# Patient Record
Sex: FEMALE | Race: OTHER | NOT HISPANIC OR LATINO | URBAN - METROPOLITAN AREA
[De-identification: names, ages, dates, MRNs, and addresses within clinical notes are randomized per-mention and may not be internally consistent; named-entity substitution may affect disease eponyms.]

---

## 2017-06-19 ENCOUNTER — INPATIENT (INPATIENT)
Facility: HOSPITAL | Age: 72
LOS: 2 days | Discharge: HOME | DRG: 641 | End: 2017-06-22
Attending: HOSPITALIST | Admitting: HOSPITALIST
Payer: MEDICAID

## 2017-06-19 VITALS
TEMPERATURE: 100 F | OXYGEN SATURATION: 99 % | RESPIRATION RATE: 16 BRPM | DIASTOLIC BLOOD PRESSURE: 74 MMHG | HEART RATE: 64 BPM | SYSTOLIC BLOOD PRESSURE: 152 MMHG

## 2017-06-19 LAB
ALBUMIN SERPL ELPH-MCNC: 3.7 G/DL — SIGNIFICANT CHANGE UP (ref 3.3–5)
ALP SERPL-CCNC: 37 U/L — LOW (ref 40–120)
ALT FLD-CCNC: 17 U/L RC — SIGNIFICANT CHANGE UP (ref 10–45)
ANION GAP SERPL CALC-SCNC: 13 MMOL/L — SIGNIFICANT CHANGE UP (ref 5–17)
APTT BLD: 23.1 SEC — LOW (ref 27.5–37.4)
AST SERPL-CCNC: 21 U/L — SIGNIFICANT CHANGE UP (ref 10–40)
BASOPHILS # BLD AUTO: 0.1 K/UL — SIGNIFICANT CHANGE UP (ref 0–0.2)
BASOPHILS NFR BLD AUTO: 1 % — SIGNIFICANT CHANGE UP (ref 0–2)
BILIRUB SERPL-MCNC: 0.3 MG/DL — SIGNIFICANT CHANGE UP (ref 0.2–1.2)
BUN SERPL-MCNC: 21 MG/DL — SIGNIFICANT CHANGE UP (ref 7–23)
CALCIUM SERPL-MCNC: 9.2 MG/DL — SIGNIFICANT CHANGE UP (ref 8.4–10.5)
CHLORIDE SERPL-SCNC: 108 MMOL/L — SIGNIFICANT CHANGE UP (ref 96–108)
CK MB BLD-MCNC: 2.3 % — SIGNIFICANT CHANGE UP (ref 0–3.5)
CK MB CFR SERPL CALC: 2.9 NG/ML — SIGNIFICANT CHANGE UP (ref 0–3.8)
CK SERPL-CCNC: 125 U/L — SIGNIFICANT CHANGE UP (ref 25–170)
CO2 SERPL-SCNC: 22 MMOL/L — SIGNIFICANT CHANGE UP (ref 22–31)
CREAT SERPL-MCNC: 0.83 MG/DL — SIGNIFICANT CHANGE UP (ref 0.5–1.3)
EOSINOPHIL # BLD AUTO: 0.3 K/UL — SIGNIFICANT CHANGE UP (ref 0–0.5)
EOSINOPHIL NFR BLD AUTO: 5 % — SIGNIFICANT CHANGE UP (ref 0–6)
GAS PNL BLDV: SIGNIFICANT CHANGE UP
GLUCOSE SERPL-MCNC: 79 MG/DL — SIGNIFICANT CHANGE UP (ref 70–99)
HCT VFR BLD CALC: 34.7 % — SIGNIFICANT CHANGE UP (ref 34.5–45)
HGB BLD-MCNC: 11.3 G/DL — LOW (ref 11.5–15.5)
INR BLD: 1.01 RATIO — SIGNIFICANT CHANGE UP (ref 0.88–1.16)
LYMPHOCYTES # BLD AUTO: 1.7 K/UL — SIGNIFICANT CHANGE UP (ref 1–3.3)
LYMPHOCYTES # BLD AUTO: 33.6 % — SIGNIFICANT CHANGE UP (ref 13–44)
MCHC RBC-ENTMCNC: 31.9 PG — SIGNIFICANT CHANGE UP (ref 27–34)
MCHC RBC-ENTMCNC: 32.7 GM/DL — SIGNIFICANT CHANGE UP (ref 32–36)
MCV RBC AUTO: 97.7 FL — SIGNIFICANT CHANGE UP (ref 80–100)
MONOCYTES # BLD AUTO: 0.4 K/UL — SIGNIFICANT CHANGE UP (ref 0–0.9)
MONOCYTES NFR BLD AUTO: 8.4 % — SIGNIFICANT CHANGE UP (ref 2–14)
NEUTROPHILS # BLD AUTO: 2.7 K/UL — SIGNIFICANT CHANGE UP (ref 1.8–7.4)
NEUTROPHILS NFR BLD AUTO: 52 % — SIGNIFICANT CHANGE UP (ref 43–77)
PLATELET # BLD AUTO: 210 K/UL — SIGNIFICANT CHANGE UP (ref 150–400)
POTASSIUM SERPL-MCNC: 3.9 MMOL/L — SIGNIFICANT CHANGE UP (ref 3.5–5.3)
POTASSIUM SERPL-SCNC: 3.9 MMOL/L — SIGNIFICANT CHANGE UP (ref 3.5–5.3)
PROT SERPL-MCNC: 7.9 G/DL — SIGNIFICANT CHANGE UP (ref 6–8.3)
PROTHROM AB SERPL-ACNC: 10.9 SEC — SIGNIFICANT CHANGE UP (ref 9.8–12.7)
RBC # BLD: 3.55 M/UL — LOW (ref 3.8–5.2)
RBC # FLD: 11.6 % — SIGNIFICANT CHANGE UP (ref 10.3–14.5)
SODIUM SERPL-SCNC: 143 MMOL/L — SIGNIFICANT CHANGE UP (ref 135–145)
TROPONIN T SERPL-MCNC: <0.01 NG/ML — SIGNIFICANT CHANGE UP (ref 0–0.06)
TROPONIN T SERPL-MCNC: <0.01 NG/ML — SIGNIFICANT CHANGE UP (ref 0–0.06)
TSH SERPL-MCNC: 5.11 UIU/ML — HIGH (ref 0.27–4.2)
WBC # BLD: 5.2 K/UL — SIGNIFICANT CHANGE UP (ref 3.8–10.5)
WBC # FLD AUTO: 5.2 K/UL — SIGNIFICANT CHANGE UP (ref 3.8–10.5)

## 2017-06-19 PROCEDURE — 99220: CPT | Mod: 25

## 2017-06-19 PROCEDURE — 93010 ELECTROCARDIOGRAM REPORT: CPT

## 2017-06-19 RX ORDER — ASPIRIN/CALCIUM CARB/MAGNESIUM 324 MG
81 TABLET ORAL DAILY
Qty: 0 | Refills: 0 | Status: DISCONTINUED | OUTPATIENT
Start: 2017-06-19 | End: 2017-06-22

## 2017-06-19 RX ORDER — ACETAMINOPHEN 500 MG
650 TABLET ORAL EVERY 6 HOURS
Qty: 0 | Refills: 0 | Status: DISCONTINUED | OUTPATIENT
Start: 2017-06-19 | End: 2017-06-22

## 2017-06-19 RX ORDER — DIPHENHYDRAMINE HCL 50 MG
50 CAPSULE ORAL ONCE
Qty: 0 | Refills: 0 | Status: COMPLETED | OUTPATIENT
Start: 2017-06-19 | End: 2017-06-19

## 2017-06-19 RX ADMIN — Medication 40 MILLIGRAM(S): at 14:03

## 2017-06-19 RX ADMIN — Medication 50 MILLIGRAM(S): at 17:00

## 2017-06-19 NOTE — ED PROVIDER NOTE - OBJECTIVE STATEMENT
71 y/o female w/ uncertain medical history (sees docs in [place]) presents with months of palptiations, right hip pain, and intermittent headaches.  Came to ER today because last night she developed a nonproductive cough. PAtient has no doctors in the [place] and is visiting for 3 months.  Came from [place] 2 weeks prior. no sick contacts, no fevers, no nausea/vomiting, no shortness of breath.

## 2017-06-19 NOTE — ED PROVIDER NOTE - PROGRESS NOTE DETAILS
Pt with pvc's on monitor.  palpitations coincide with pvc length. When questioned if palpitations are present when pvcs resolved, patient has no palpitations.

## 2017-06-19 NOTE — ED CDU PROVIDER NOTE - PHYSICAL EXAMINATION
. Pelvis is stable. R hip pain elicited w ROM. CN2-12 intact 5/5 UE/LE nml sensation. Extremity with no swelling or calf tenderness.

## 2017-06-19 NOTE — ED PROVIDER NOTE - ATTENDING CONTRIBUTION TO CARE
****ATTENDING**** 73yo f hx HTN, CAD recently travelled from [place] (where she lives) pw HA, chest pain, palpitations and R hip pain. As per niece, pt has had symptoms chronically over time and co symptoms to her who brought her to the ED for evaluation. Pt co generalized HA, no change in vision, nausea vomiting, improves intermittently w meds. Also reports palpitations and chest pain, never had stress test before. No cough, uri, fever or chills. Also reports R hip pain no trauma. Pain is worse with activity.  EKG reviewed. Check Labs, CE, CT Head, Xray Hip. Patient with chronic symptoms, no prior cardiac work up. Admit to CDU for stress test.

## 2017-06-19 NOTE — ED CDU PROVIDER NOTE - ATTENDING CONTRIBUTION TO CARE
****ATTENDING**** 73yo f hx HTN, CAD recently travelled from [place] (where she lives) pw HA, chest pain, palpitations and R hip pain. As per niece, pt has had symptoms chronically over time and co symptoms to her who brought her to the ED for evaluation. Pt co generalized HA, no change in vision, nausea vomiting, improves intermittently w meds. Also reports palpitations and chest pain, never had stress test before. No cough, uri, fever or chills. Also reports R hip pain no trauma. Pain is worse with activity.  EKG reviewed. Labs and radiology reviewed. Patient does not have outpatient follow up and no prior cardiac work up. Admit patient to CDU for monitoring and stress test.

## 2017-06-19 NOTE — ED PROVIDER NOTE - PHYSICAL EXAMINATION
****ATTENDING**** Patient is awake,alert,oriented x 3. Patient is well appearing and in no acute distress. Patient's chest is clear to ausculation, +s1s2. Abdomen is soft nd/nt +BS. Pelvis is stable. R hip pain elicited w ROM. CN2-12 intact 5/5 UE/LE nml sensation. Extremity with no swelling or calf tenderness.

## 2017-06-19 NOTE — ED ADULT NURSE NOTE - OBJECTIVE STATEMENT
72 y f came to the ed with palpitations. states they started last night and continued through this morning so she came to the ed. states having a cardiac hx and is seen in her native country of [place] by a cardiologist. also c/o of a nonproductive cough. patient is a/ox3. denies any sob but c/o chest pain with the palpitations. denies n/v/d. abdomen is soft and nontender. skin is warm and dry.

## 2017-06-19 NOTE — ED PROVIDER NOTE - MEDICAL DECISION MAKING DETAILS
****ATTENDING**** 71yo f hx HTN, CAD recently travelled from [place] (where she lives) pw HA, chest pain, palpitations and R hip pain. As per niece, pt has had symptoms chronically over time and co symptoms to her who brought her to the ED for evaluation. Pt co generalized HA, no change in vision, nausea vomiting, improves intermittently w meds. Also reports palpitations and chest pain, never had stress test before. No cough, uri, fever or chills. Also reports R hip pain no trauma. Pain is worse with activity.  EKG reviewed. Check Labs, CE, CT Head, Xray Hip. Patient with chronic symptoms, no prior cardiac work up. Admit to CDU for stress test.

## 2017-06-19 NOTE — ED CDU PROVIDER NOTE - PLAN OF CARE
1. Follow up with your PMD and/or cardiologist within 48-72 hours.   2. Show copies of your reports given to you. Recommend Aspirin 81mg over the counter daily until further evaluation.  Take all of your other medications as previously prescribed.   3. Worsening or continued chest pain, shortness of breath, weakness, return to ED.

## 2017-06-19 NOTE — ED CDU PROVIDER NOTE - PROGRESS NOTE DETAILS
Patient resting in bed comfortably. No distress, no complaints. Vital Signs Stable. No events on telemetry monitor; pending cta results -JAN Rivera CDU PROGRESS NOTE JAN HERNANDEZ: Pt resting comfortably, feeling well without complaint. NAD, VSS. No events on telemetry. Patient resting comfortably. NAD. NSR on tele. VSS. CE #2 negative. EKG unchanged. Cont to monitor. Proceed with Stress test in am -PA Pao Rivera Pt resting comfortably. NAD. No complaints. VSS. pending stress test. waiting for daughter to arrive. -JAN Rivera at stress test. -JAN Rivera pt back from stress. no complaints. VSS. +stress test with signs of ischemia. Called Dr. Almanzar for further management. Case discussed with Dr. Maira Rivera Patient resting comfortably. NAD. NSR on tele. VSS. CE #2 negative. EKG unchanged. Cont to monitor. Proceed with Stress test in am. Daughter brought meds over and pt takes enalapril 5mg daily, Asa 300mg daily, Digoxin 0.25mg daily (?Afib).-JAN Rivera Dr. Rao Note: I have personally performed a face to face diagnostic evaluation on this patient.  I have reviewed the ACP note and agree with the history, exam, and plan of care, except as noted.  History and Exam by me shows well appearing, NAD, no chest pain currently but +stress test.  Stable for disposition per cards.

## 2017-06-19 NOTE — ED CDU PROVIDER NOTE - OBJECTIVE STATEMENT
73 y/o female w/ uncertain medical history ?asthma (one intubation in her 30s) (sees docs in [facility]) presents with months of palpitations, right hip pain, and intermittent headaches.  Came to ER today because last night she developed a nonproductive cough. Patient [place] no doctors in the [place] and is visiting for 3 months.  Came from [place] 2 weeks prior. no sick contacts, no fevers, no nausea/vomiting, no shortness of breath.

## 2017-06-20 DIAGNOSIS — R00.2 PALPITATIONS: ICD-10-CM

## 2017-06-20 DIAGNOSIS — I10 ESSENTIAL (PRIMARY) HYPERTENSION: ICD-10-CM

## 2017-06-20 DIAGNOSIS — Z29.9 ENCOUNTER FOR PROPHYLACTIC MEASURES, UNSPECIFIED: ICD-10-CM

## 2017-06-20 DIAGNOSIS — E78.1 PURE HYPERGLYCERIDEMIA: ICD-10-CM

## 2017-06-20 DIAGNOSIS — R94.39 ABNORMAL RESULT OF OTHER CARDIOVASCULAR FUNCTION STUDY: ICD-10-CM

## 2017-06-20 LAB
APPEARANCE UR: CLEAR — SIGNIFICANT CHANGE UP
BILIRUB UR-MCNC: NEGATIVE — SIGNIFICANT CHANGE UP
CHOLEST SERPL-MCNC: 255 MG/DL — HIGH (ref 10–199)
COLOR SPEC: YELLOW — SIGNIFICANT CHANGE UP
DIFF PNL FLD: NEGATIVE — SIGNIFICANT CHANGE UP
EPI CELLS # UR: SIGNIFICANT CHANGE UP /HPF
GLUCOSE UR QL: NEGATIVE — SIGNIFICANT CHANGE UP
HBA1C BLD-MCNC: 5.7 % — HIGH (ref 4–5.6)
HDLC SERPL-MCNC: 56 MG/DL — SIGNIFICANT CHANGE UP (ref 40–125)
KETONES UR-MCNC: NEGATIVE — SIGNIFICANT CHANGE UP
LEUKOCYTE ESTERASE UR-ACNC: ABNORMAL
LIPID PNL WITH DIRECT LDL SERPL: 183 MG/DL — HIGH
NITRITE UR-MCNC: NEGATIVE — SIGNIFICANT CHANGE UP
PH UR: 6 — SIGNIFICANT CHANGE UP (ref 5–8)
PROT UR-MCNC: SIGNIFICANT CHANGE UP
RBC CASTS # UR COMP ASSIST: SIGNIFICANT CHANGE UP /HPF (ref 0–2)
SP GR SPEC: 1.03 — HIGH (ref 1.01–1.02)
TOTAL CHOLESTEROL/HDL RATIO MEASUREMENT: 4.6 RATIO — SIGNIFICANT CHANGE UP (ref 3.3–7.1)
TRIGL SERPL-MCNC: 79 MG/DL — SIGNIFICANT CHANGE UP (ref 10–149)
UROBILINOGEN FLD QL: NEGATIVE — SIGNIFICANT CHANGE UP
WBC UR QL: SIGNIFICANT CHANGE UP /HPF (ref 0–5)

## 2017-06-20 PROCEDURE — 93018 CV STRESS TEST I&R ONLY: CPT

## 2017-06-20 PROCEDURE — 99223 1ST HOSP IP/OBS HIGH 75: CPT

## 2017-06-20 PROCEDURE — 93010 ELECTROCARDIOGRAM REPORT: CPT | Mod: 77

## 2017-06-20 PROCEDURE — 93016 CV STRESS TEST SUPVJ ONLY: CPT

## 2017-06-20 PROCEDURE — 99217: CPT

## 2017-06-20 PROCEDURE — 78452 HT MUSCLE IMAGE SPECT MULT: CPT | Mod: 26

## 2017-06-20 RX ORDER — DIGOXIN 250 MCG
0.25 TABLET ORAL DAILY
Qty: 0 | Refills: 0 | Status: DISCONTINUED | OUTPATIENT
Start: 2017-06-20 | End: 2017-06-22

## 2017-06-20 RX ORDER — FUROSEMIDE 40 MG
1 TABLET ORAL
Qty: 0 | Refills: 0 | COMMUNITY

## 2017-06-20 RX ORDER — FUROSEMIDE 40 MG
20 TABLET ORAL DAILY
Qty: 0 | Refills: 0 | Status: DISCONTINUED | OUTPATIENT
Start: 2017-06-20 | End: 2017-06-22

## 2017-06-20 RX ORDER — DIGOXIN 250 MCG
1 TABLET ORAL
Qty: 0 | Refills: 0 | COMMUNITY

## 2017-06-20 RX ADMIN — Medication 81 MILLIGRAM(S): at 18:29

## 2017-06-20 RX ADMIN — Medication 650 MILLIGRAM(S): at 21:22

## 2017-06-20 NOTE — H&P ADULT - PROBLEM SELECTOR PLAN 4
For now as unable to explain any medications to the patient in her native language, will place on SCDs For now as unable to explain any new medications to the patient in her native language, will place on SCDs

## 2017-06-20 NOTE — H&P ADULT - PROBLEM SELECTOR PLAN 2
- Cards consulted, f/u plan for further ischemic evaluation  - risk stratified; A1c 5.7; Unable to calculate ASCVD score without hx. - Cards consulted, f/u plan for further ischemic evaluation  - risk stratified; A1c 5.7; Unable to calculate ASCVD score without hx.  - c/w ASA

## 2017-06-20 NOTE — H&P ADULT - PROBLEM SELECTOR PLAN 1
p/w palpitations for 2 months; possibly in setting of  paroxysmal afib; although EKG sinus jacobo.    - Telemetry  - check dig level, c/w dig 0.25 mg qd

## 2017-06-20 NOTE — H&P ADULT - NSHPLABSRESULTS_GEN_ALL_CORE
Personally reviewed clinical data including labs, imaging and EKG.     Labs notable for WBC 5.2 Hb 11.3 Plt 210 INR 1.01; BMP Cr 0.83 K 3.9 Trops x2 negative; A1c 5.7 UA small LE, negative nitrite.   Imaging notable for CXR clear. XR R. Hip negative for any acute fx/dislocation. CTH negative for any acute pathology. CTA Chest negative for PE.      EKG demonstrated sinus bradycardia HR 57.

## 2017-06-20 NOTE — H&P ADULT - PROBLEM SELECTOR PLAN 3
Notably hypertensive on my assessment SBP 160s  - Will c/w enalapril 5mg qd per home regimen and lasix 20mg qd   - Monitor BP closely

## 2017-06-20 NOTE — H&P ADULT - ASSESSMENT
72F w/unknown PMHx (possibly afib/HTN per meds) here visiting from [place] p/w palpitations, headache and R. hip pain found to have serial trops x2 and nuclear stress positive for reversible defect admitted for further ischemic evaluation.

## 2017-06-20 NOTE — CONSULT NOTE ADULT - PROBLEM SELECTOR RECOMMENDATION 9
Will arrange for cardiac cath.   Continue ASA  Unclear why on Digoxin. Continue for now. Check level.

## 2017-06-20 NOTE — CONSULT NOTE ADULT - SUBJECTIVE AND OBJECTIVE BOX
Chief Complaint: Chest pain    HPI: 72 F from [facility] presents with chest pain. Symptoms are on and off without clear aggravating or alleviating factors. Associated with headache. Pain radiates to the right neck. Described as an ache. Patient had stress test that showed apical ischemia. Patient has chronic history of palpitations.     PMH:   HTN  HLD  Palpitations    PSH:   No significant past surgical history    Family History:  FAMILY HISTORY:  No pertinent family history in first degree relatives    Allergies:  No Known Allergies    Social History:  Smoking: no smoking  Alcohol: None  Drugs:    Medications:  aspirin enteric coated 81milliGRAM(s) Oral daily  acetaminophen   Tablet 650milliGRAM(s) Oral every 6 hours PRN  enalapril 5milliGRAM(s) Oral daily  digoxin     Tablet 0.25milliGRAM(s) Oral daily  furosemide    Tablet 20milliGRAM(s) Oral daily      Cardiovascular Diagnostic Testing:  ECG: NSR. NS-ST-T changes    Echo:    Stress Testing: IMPRESSIONS:Abnormal Study  * Chest Pain: No chest pain with administration of  Regadenoson.  * Symptom: Shortness of breath, abdominal discomfort.  * HR Response: Appropriate.  * BP Response: Appropriate.  * Heart Rhythm: Sinus Rhythm - 65 BPM.  * Conduction defects: short WA.  * Baseline ECG: T wave inversions in leads II, III, aVF,  and V3-V6 at baseline.  * ECG Changes: No significant ischemic ST segment changes  beyond baseline abnormalities.  * Arrhythmia: Occasional VPDs occurred during stress and  recovery.  * The left ventricle was normal in size. There is a small,  moderate defect in the apex that is mostly reversible  suggestive of ischemia with a small area of scar.  * There is a small, mild defect defect in the basal  inferior and basal inferoseptal walls that is mostly fixed  suggestive of scar with minimal ischemia.  * Post-stress gated wall motion analysis was performed  (LVEF = 65 %;LVEDV = 69 ml.) revealing mild hyokinesis of  the basal inferior and basal inferoseptal walls and  reduced systolic thickening of the apex.  *** No previous Nuclear/Stress exam    Cath:    Imaging:    Labs:                        11.3   5.2   )-----------( 210      ( 2017 12:12 )             34.7     06-19    143  |  108  |  21  ----------------------------<  79  3.9   |  22  |  0.83    Ca    9.2      2017 12:12    TPro  7.9  /  Alb  3.7  /  TBili  0.3  /  DBili  x   /  AST  21  /  ALT  17  /  AlkPhos  37<L>      .PT/INR - ( 2017 12:12 )   PT: 10.9 sec;   INR: 1.01 ratio         PTT - ( 2017 12:12 )  PTT:23.1 sec  CARDIAC MARKERS ( 2017 19:13 )  x     / <0.01 ng/mL / x     / x     / x      CARDIAC MARKERS ( 2017 12:12 )  x     / <0.01 ng/mL / 125 U/L / x     / 2.9 ng/mL      Serum Pro-Brain Natriuretic Peptide: 267 pg/mL ( @ 12:12)    Total Cholesterol: 255  LDL: 183  HDL: 56  T    Hemoglobin A1C, Whole Blood: 5.7 % ( @ 07:23)    Thyroid Stimulating Hormone, Serum: 5.11 uIU/mL ( @ 16:15)      Physical Exam:  T(C): 36.8, Max: 37.1 ( @ 19:49)  HR: 66 (64 - 75)  BP: 169/74 (118/70 - 181/71)  RR: 18 (18 - 18)  SpO2: 99% (96% - 99%)  Wt(kg): --    I & Os for current day (as of  @ 21:53)  =============================================  IN: 0 ml / OUT: 300 ml / NET: -300 ml    Daily     Daily

## 2017-06-20 NOTE — H&P ADULT - HISTORY OF PRESENT ILLNESS
73 yo woman here visiting from [place] p/w palpitations, headache and     ED vitals--Afebrile HR 66 129/57 18 97%   Labs notable for WBC 5.2 Hb 11.3 Plt 210 INR 1.01; BMP Cr 0.83 K 3.9 Trops x2 negative; A1c 5.7 UA small LE, negative nitrite.   Imaging notable for CXR clear. XR R. Hip negative for any acute fx/dislocation. CTH negative for any acute pathology. CTA Chest negative for PE.      EKG demonstrated sinus bradycardia HR 57.     In the ED, she received Benadryl/Solumedrol and ASA.     She was admitted to the CDU for further ischemic evaluation and underwent a nuclear stress which demonstrated a reversible small moderate defect at the apex.    Cardiology was consulted and she was admitted to medicine for further ischemic evaluation. Unable to obtain any reliable hx from the patient; Patient reports that she speaks Miranda (language not provided by [employer] and there were not any available Afrikaans translators) Additionally attempted to contact a family member at [phone] but unable to contact    71 yo woman w/unclear PMHx here visiting from [place] p/w palpitations, headache and R. hip pain. Reportedly, the patient has been experiencing palpitations for months and intermittent headaches. She [place]. Per ED, she arrived from [place], 2 weeks ago and denied any symptoms of fevers/nausea/vomiting, SOB, or sick contacts.     She had her medications at bedside for medication reconciliation.     ED vitals--Afebrile HR 66 129/57 18 97%   Labs notable for WBC 5.2 Hb 11.3 Plt 210 INR 1.01; BMP Cr 0.83 K 3.9 Trops x2 negative; A1c 5.7 UA small LE, negative nitrite.   Imaging notable for CXR clear. XR R. Hip negative for any acute fx/dislocation. CTH negative for any acute pathology. CTA Chest negative for PE.      EKG demonstrated sinus bradycardia HR 57.     In the ED, she received Benadryl/Solumedrol and ASA.     She was admitted to the CDU for further ischemic evaluation and underwent a nuclear stress which demonstrated a reversible small moderate defect at the apex.    Cardiology was consulted and she was admitted to medicine for further ischemic evaluation.

## 2017-06-20 NOTE — ED ADULT NURSE REASSESSMENT NOTE - GENERAL PATIENT STATE
comfortable appearance/family/SO at bedside
family/SO at bedside/resting/sleeping/comfortable appearance
cooperative/family/SO at bedside/smiling/interactive/comfortable appearance

## 2017-06-20 NOTE — ED ADULT NURSE REASSESSMENT NOTE - NS ED NURSE REASSESS COMMENT FT1
Report taken from Alta HU at 7am
report taken from Kurt HU pt placed in CDU Bed 3. pt states no complaints.
pt taken to Stress Test
Pt received from FRITZ Warner. Pt oriented to CDU & plan of care was discussed. No complaints of chest pain, SOB, dizziness or palpitations. Safety & comfort measures maintained. Call bell in reach. Will continue to monitor.

## 2017-06-21 LAB
ANION GAP SERPL CALC-SCNC: 10 MMOL/L — SIGNIFICANT CHANGE UP (ref 5–17)
BASOPHILS # BLD AUTO: 0.06 K/UL — SIGNIFICANT CHANGE UP (ref 0–0.2)
BASOPHILS NFR BLD AUTO: 1.2 % — SIGNIFICANT CHANGE UP (ref 0–2)
BUN SERPL-MCNC: 20 MG/DL — SIGNIFICANT CHANGE UP (ref 7–23)
CALCIUM SERPL-MCNC: 8.8 MG/DL — SIGNIFICANT CHANGE UP (ref 8.4–10.5)
CHLORIDE SERPL-SCNC: 109 MMOL/L — HIGH (ref 96–108)
CO2 SERPL-SCNC: 22 MMOL/L — SIGNIFICANT CHANGE UP (ref 22–31)
CREAT SERPL-MCNC: 0.83 MG/DL — SIGNIFICANT CHANGE UP (ref 0.5–1.3)
CULTURE RESULTS: NO GROWTH — SIGNIFICANT CHANGE UP
DIGOXIN SERPL-MCNC: 0.6 NG/ML — LOW (ref 0.8–2)
EOSINOPHIL # BLD AUTO: 0.15 K/UL — SIGNIFICANT CHANGE UP (ref 0–0.5)
EOSINOPHIL NFR BLD AUTO: 3.1 % — SIGNIFICANT CHANGE UP (ref 0–6)
GLUCOSE SERPL-MCNC: 88 MG/DL — SIGNIFICANT CHANGE UP (ref 70–99)
HCT VFR BLD CALC: 33.4 % — LOW (ref 34.5–45)
HGB BLD-MCNC: 10.5 G/DL — LOW (ref 11.5–15.5)
IMM GRANULOCYTES NFR BLD AUTO: 0 % — SIGNIFICANT CHANGE UP (ref 0–1.5)
LYMPHOCYTES # BLD AUTO: 2.06 K/UL — SIGNIFICANT CHANGE UP (ref 1–3.3)
LYMPHOCYTES # BLD AUTO: 42.2 % — SIGNIFICANT CHANGE UP (ref 13–44)
MCHC RBC-ENTMCNC: 30.7 PG — SIGNIFICANT CHANGE UP (ref 27–34)
MCHC RBC-ENTMCNC: 31.4 GM/DL — LOW (ref 32–36)
MCV RBC AUTO: 97.7 FL — SIGNIFICANT CHANGE UP (ref 80–100)
MONOCYTES # BLD AUTO: 0.34 K/UL — SIGNIFICANT CHANGE UP (ref 0–0.9)
MONOCYTES NFR BLD AUTO: 7 % — SIGNIFICANT CHANGE UP (ref 2–14)
NEUTROPHILS # BLD AUTO: 2.27 K/UL — SIGNIFICANT CHANGE UP (ref 1.8–7.4)
NEUTROPHILS NFR BLD AUTO: 46.5 % — SIGNIFICANT CHANGE UP (ref 43–77)
PLATELET # BLD AUTO: 218 K/UL — SIGNIFICANT CHANGE UP (ref 150–400)
POTASSIUM SERPL-MCNC: 4.2 MMOL/L — SIGNIFICANT CHANGE UP (ref 3.5–5.3)
POTASSIUM SERPL-SCNC: 4.2 MMOL/L — SIGNIFICANT CHANGE UP (ref 3.5–5.3)
RBC # BLD: 3.42 M/UL — LOW (ref 3.8–5.2)
RBC # FLD: 13.6 % — SIGNIFICANT CHANGE UP (ref 10.3–14.5)
SODIUM SERPL-SCNC: 141 MMOL/L — SIGNIFICANT CHANGE UP (ref 135–145)
SPECIMEN SOURCE: SIGNIFICANT CHANGE UP
WBC # BLD: 4.88 K/UL — SIGNIFICANT CHANGE UP (ref 3.8–10.5)
WBC # FLD AUTO: 4.88 K/UL — SIGNIFICANT CHANGE UP (ref 3.8–10.5)

## 2017-06-21 PROCEDURE — 99233 SBSQ HOSP IP/OBS HIGH 50: CPT

## 2017-06-21 PROCEDURE — 99232 SBSQ HOSP IP/OBS MODERATE 35: CPT

## 2017-06-21 PROCEDURE — 93454 CORONARY ARTERY ANGIO S&I: CPT | Mod: 26,GC

## 2017-06-21 RX ADMIN — Medication 650 MILLIGRAM(S): at 19:51

## 2017-06-21 RX ADMIN — Medication 20 MILLIGRAM(S): at 05:09

## 2017-06-21 RX ADMIN — Medication 81 MILLIGRAM(S): at 19:51

## 2017-06-21 RX ADMIN — Medication 0.25 MILLIGRAM(S): at 09:54

## 2017-06-21 RX ADMIN — Medication 5 MILLIGRAM(S): at 05:09

## 2017-06-21 NOTE — DISCHARGE NOTE ADULT - MEDICATION SUMMARY - MEDICATIONS TO TAKE
I will START or STAY ON the medications listed below when I get home from the hospital:    aspirin 81 mg oral delayed release tablet  -- 1 tab(s) by mouth once a day  -- Indication: For Atrial fibrillation    enalapril 5 mg oral tablet  -- 1 tab(s) by mouth once a day  -- Indication: For hypertension    digoxin 250 mcg (0.25 mg) oral tablet  -- 1 tab(s) by mouth once a day  -- Indication: For Atrial fibrillation    Lipitor 20 mg oral tablet  -- 1 tab(s) by mouth once a day  -- Indication: For coronary artery disease    Lasix 20 mg oral tablet  -- 1 tab(s) by mouth once a day  -- Indication: For hypertension

## 2017-06-21 NOTE — DISCHARGE NOTE ADULT - MEDICATION SUMMARY - MEDICATIONS TO CHANGE
I will SWITCH the dose or number of times a day I take the medications listed below when I get home from the hospital:    aspirin 300 mg oral delayed release tablet  -- 1 tab(s) by mouth once a day

## 2017-06-21 NOTE — DISCHARGE NOTE ADULT - CARE PLAN
Goal:	Palpitations Principal Discharge DX:	Palpitations  Goal:	Palpitations  Instructions for follow-up, activity and diet:	angiogram with no significant coronary artery disease  Secondary Diagnosis:	Essential hypertension  Secondary Diagnosis:	Atrial fibrillation Principal Discharge DX:	Palpitations  Goal:	Palpitations  Instructions for follow-up, activity and diet:	angiogram with no significant coronary artery disease  Secondary Diagnosis:	Essential hypertension  Instructions for follow-up, activity and diet:	Low salt diet  Activity as tolerated.  Take all medication as prescribed.  Follow up with your medical doctor for routine blood pressure monitoring at your next visit.  Notify your doctor if you have any of the following symptoms:   Dizziness, Lightheadedness, Blurry vision, Headache, Chest pain, Shortness of breath  Secondary Diagnosis:	Atrial fibrillation  Instructions for follow-up, activity and diet:	Atrial fibrillation is the most common heart rhythm problem & has the risk of stroke & heart attack  It helps if you control your blood pressure, not drink more than 1-2 alcohol drinks per day, cut down on caffeine, getting treatment for over active thyroid gland, & getting exercise  Call your doctor if you feel your heart racing or beating unusually, chest tightness or pain, lightheaded, faint, shortness of breath especially with exercise  It is important to take your heart medication as prescribed

## 2017-06-21 NOTE — PROGRESS NOTE ADULT - PROBLEM SELECTOR PLAN 1
p/w palpitations for 2 months; possibly in setting of  paroxysmal afib; although EKG sinus jacobo.    - Cardiology on board  - Telemetry  - dig level pending, c/w dig 0.25 mg qd  - will check TSH, free T4 p/w palpitations for 2 months; possibly in setting of  paroxysmal afib; although EKG sinus jacobo.    - Cardiology on board for cath today  - Telemetry  - dig level pending, c/w dig 0.25 mg qd  - will check TSH, free T4

## 2017-06-21 NOTE — PROGRESS NOTE ADULT - PROBLEM SELECTOR PLAN 2
- Discussed case with cards consult appreciated,   - plan for cardiac cath today, explained to patients daughter Katelyn ([phone]) and patient.   - c/w ASA

## 2017-06-21 NOTE — DISCHARGE NOTE ADULT - HOSPITAL COURSE
Ms. Richard is a 72 year old female, Miranda speaking (language not provided by [employer] and there were not any available Afrikaans translators), with unclear past medical history who is visiting from [place] who presented to the ED c/o of palpitations headaches and right hip pain. History obtained from patients daughter, Katelyn ([phone]) called and explained that patient had abnormal nuclear stress test,     cardiac cath on 6/21/17 Ms. Richard is a 72 year old female, Miranda speaking (language not provided by [employer] and there were not any available Afrikaans translators), with unclear past medical history who is visiting from [place] who presented to the ED c/o of palpitations headaches and right hip pain. History obtained from patients daughter, Katelyn ([phone]) called and explained that patient had abnormal nuclear stress test,     cardiac cath on 6/21/17 - no significant CAD.  Likely palpitations secondary to dehydration.  No palpitations since admission

## 2017-06-21 NOTE — DISCHARGE NOTE ADULT - PATIENT PORTAL LINK FT
“You can access the Follow[facility] Patient Portal, offered by [facility], by registering with the following website: [email or web address]”

## 2017-06-21 NOTE — PROGRESS NOTE ADULT - SUBJECTIVE AND OBJECTIVE BOX
CC: Chest pain. Abnormal stress.     Interval History: No significant cardiac events overnight.     MEDICATIONS:  aspirin enteric coated 81milliGRAM(s) Oral daily  acetaminophen   Tablet 650milliGRAM(s) Oral every 6 hours PRN  enalapril 5milliGRAM(s) Oral daily  digoxin     Tablet 0.25milliGRAM(s) Oral daily  furosemide    Tablet 20milliGRAM(s) Oral daily      LABS:      141  |  109<H>  |  20  ----------------------------<  88  4.2   |  22  |  0.83    Ca    8.8      2017 07:23                            10.5   4.88  )-----------( 218      ( 2017 07:14 )             33.4       CARDIAC MARKERS ( 2017 19:13 )  x     / <0.01 ng/mL / x     / x     / x              VITAL SIGNS:   T(C): 36.6, Max: 37.1 ( @ 19:49)  HR: 70 (56 - 70)  BP: 165/78 (115/67 - 181/71)  RR: 18 (18 - 18)  SpO2: 98% (96% - 99%)  Wt(kg): --  Daily     Daily Weight in k.5 (2017 08:10)  I&O's Summary  I & Os for 24h ending 2017 07:00  =============================================  IN: 0 ml / OUT: 300 ml / NET: -300 ml    I & Os for current day (as of 2017 12:51)  =============================================  IN: 360 ml / OUT: 0 ml / NET: 360 ml      TELE: No significant ectopy

## 2017-06-21 NOTE — DISCHARGE NOTE ADULT - PLAN OF CARE
Palpitations angiogram with no significant coronary artery disease Low salt diet  Activity as tolerated.  Take all medication as prescribed.  Follow up with your medical doctor for routine blood pressure monitoring at your next visit.  Notify your doctor if you have any of the following symptoms:   Dizziness, Lightheadedness, Blurry vision, Headache, Chest pain, Shortness of breath Atrial fibrillation is the most common heart rhythm problem & has the risk of stroke & heart attack  It helps if you control your blood pressure, not drink more than 1-2 alcohol drinks per day, cut down on caffeine, getting treatment for over active thyroid gland, & getting exercise  Call your doctor if you feel your heart racing or beating unusually, chest tightness or pain, lightheaded, faint, shortness of breath especially with exercise  It is important to take your heart medication as prescribed

## 2017-06-21 NOTE — PROGRESS NOTE ADULT - SUBJECTIVE AND OBJECTIVE BOX
Patient is a 72y old  Female who presents with a chief complaint of palpitations     SUBJECTIVE / OVERNIGHT EVENTS: Patient states she feels better, denies any CP, palpitations, SOB     ROS:  All other review of systems negative    Allergies    No Known Allergies    Intolerances        MEDICATIONS  (STANDING):  aspirin enteric coated 81milliGRAM(s) Oral daily  enalapril 5milliGRAM(s) Oral daily  digoxin     Tablet 0.25milliGRAM(s) Oral daily  furosemide    Tablet 20milliGRAM(s) Oral daily    MEDICATIONS  (PRN):  acetaminophen   Tablet 650milliGRAM(s) Oral every 6 hours PRN pain      Vital Signs Last 24 Hrs  T(C): 36.9, Max: 37.1 ( @ 19:49)  T(F): 98.5, Max: 98.8 ( @ 19:49)  HR: 62 (56 - 68)  BP: 132/61 (115/67 - 181/71)  BP(mean): --  RR: 18 (18 - 18)  SpO2: 98% (96% - 99%)  CAPILLARY BLOOD GLUCOSE  125 (2017 21:15)    I&O's Summary    I & Os for current day (as of 2017 10:12)  =============================================  IN: 0 ml / OUT: 300 ml / NET: -300 ml      PHYSICAL EXAM:  GENERAL: NAD, well-developed, Miranda speaking   HEAD:  Atraumatic, Normocephalic  EYES: EOMI, PERRLA, conjunctiva and sclera clear  NECK: Supple, No JVD  CHEST/LUNG: Clear to auscultation bilaterally; No wheeze, rales   HEART: Regular rate and rhythm; No murmurs, rubs, or gallops  ABDOMEN: Soft, Nontender, Nondistended; Bowel sounds present  EXTREMITIES:  2+ Peripheral Pulses, No clubbing, cyanosis, or edema  NEUROLOGY: AAOx3, non-focal  PSYCH: calm  SKIN: No rashes or lesions    LABS:                        10.5   4.88  )-----------( 218      ( 2017 07:14 )             33.4     06-21    141  |  109<H>  |  20  ----------------------------<  88  4.2   |  22  |  0.83    Ca    8.8      2017 07:23    TPro  7.9  /  Alb  3.7  /  TBili  0.3  /  DBili  x   /  AST  21  /  ALT  17  /  AlkPhos  37<L>  06-19    PT/INR - ( 2017 12:12 )   PT: 10.9 sec;   INR: 1.01 ratio         PTT - ( 2017 12:12 )  PTT:23.1 sec  CARDIAC MARKERS ( 2017 19:13 )  x     / <0.01 ng/mL / x     / x     / x      CARDIAC MARKERS ( 2017 12:12 )  x     / <0.01 ng/mL / 125 U/L / x     / 2.9 ng/mL      Urinalysis Basic - ( 2017 09:16 )    Color: Yellow / Appearance: Clear / S.029 / pH: x  Gluc: x / Ketone: Negative  / Bili: Negative / Urobili: Negative   Blood: x / Protein: Trace / Nitrite: Negative   Leuk Esterase: Small / RBC: 0-2 /HPF / WBC 3-5 /HPF   Sq Epi: x / Non Sq Epi: OCC /HPF / Bacteria: x        RADIOLOGY & ADDITIONAL TESTS:    Imaging Personally Reviewed:    Consultant(s) Notes Reviewed:  Cardiology     Care Discussed with Consultants/Other Providers: Cardiology, NP     Case Discussed with Family: Tony Zepeda [phone]    Goals of Care: Patient is a 72y old  Female who presents with a chief complaint of palpitations     SUBJECTIVE / OVERNIGHT EVENTS: Patient states she feels better, denies any CP, palpitations, SOB     ROS:  All other review of systems negative    Allergies    No Known Allergies    Intolerances        MEDICATIONS  (STANDING):  aspirin enteric coated 81milliGRAM(s) Oral daily  enalapril 5milliGRAM(s) Oral daily  digoxin     Tablet 0.25milliGRAM(s) Oral daily  furosemide    Tablet 20milliGRAM(s) Oral daily    MEDICATIONS  (PRN):  acetaminophen   Tablet 650milliGRAM(s) Oral every 6 hours PRN pain    Tele - sinus 50-70 PVCs, bigem not frequent  Vital Signs Last 24 Hrs  T(C): 36.9, Max: 37.1 ( @ 19:49)  T(F): 98.5, Max: 98.8 ( @ 19:49)  HR: 62 (56 - 68)  BP: 132/61 (115/67 - 181/71)  BP(mean): --  RR: 18 (18 - 18)  SpO2: 98% (96% - 99%)  CAPILLARY BLOOD GLUCOSE  125 (2017 21:15)    I&O's Summary    I & Os for current day (as of 2017 10:12)  =============================================  IN: 0 ml / OUT: 300 ml / NET: -300 ml      PHYSICAL EXAM:  GENERAL: NAD, well-developed, Miranda speaking   HEAD:  Atraumatic, Normocephalic  EYES: EOMI, PERRLA, conjunctiva and sclera clear  NECK: Supple, No JVD  CHEST/LUNG: Clear to auscultation bilaterally; No wheeze, rales   HEART: Regular rate and rhythm; No murmurs, rubs, or gallops  ABDOMEN: Soft, Nontender, Nondistended; Bowel sounds present  EXTREMITIES:  2+ Peripheral Pulses, No clubbing, cyanosis, or edema  NEUROLOGY: AAOx3, non-focal  PSYCH: calm  SKIN: No rashes or lesions    LABS:                        10.5   4.88  )-----------( 218      ( 2017 07:14 )             33.4     06-21    141  |  109<H>  |  20  ----------------------------<  88  4.2   |  22  |  0.83    Ca    8.8      2017 07:23    TPro  7.9  /  Alb  3.7  /  TBili  0.3  /  DBili  x   /  AST  21  /  ALT  17  /  AlkPhos  37<L>      PT/INR - ( 2017 12:12 )   PT: 10.9 sec;   INR: 1.01 ratio         PTT - ( 2017 12:12 )  PTT:23.1 sec  CARDIAC MARKERS ( 2017 19:13 )  x     / <0.01 ng/mL / x     / x     / x      CARDIAC MARKERS ( 2017 12:12 )  x     / <0.01 ng/mL / 125 U/L / x     / 2.9 ng/mL      Urinalysis Basic - ( 2017 09:16 )    Color: Yellow / Appearance: Clear / S.029 / pH: x  Gluc: x / Ketone: Negative  / Bili: Negative / Urobili: Negative   Blood: x / Protein: Trace / Nitrite: Negative   Leuk Esterase: Small / RBC: 0-2 /HPF / WBC 3-5 /HPF   Sq Epi: x / Non Sq Epi: OCC /HPF / Bacteria: x        RADIOLOGY & ADDITIONAL TESTS:    Imaging Personally Reviewed:    Consultant(s) Notes Reviewed:  Cardiology     Care Discussed with Consultants/Other Providers: Cardiology, NP     Case Discussed with Family: Daughter Katelyn [phone]    Goals of Care:

## 2017-06-22 VITALS
DIASTOLIC BLOOD PRESSURE: 72 MMHG | HEART RATE: 64 BPM | RESPIRATION RATE: 18 BRPM | SYSTOLIC BLOOD PRESSURE: 118 MMHG | TEMPERATURE: 98 F | OXYGEN SATURATION: 98 %

## 2017-06-22 LAB
ANION GAP SERPL CALC-SCNC: 12 MMOL/L — SIGNIFICANT CHANGE UP (ref 5–17)
BUN SERPL-MCNC: 18 MG/DL — SIGNIFICANT CHANGE UP (ref 7–23)
CALCIUM SERPL-MCNC: 9.3 MG/DL — SIGNIFICANT CHANGE UP (ref 8.4–10.5)
CHLORIDE SERPL-SCNC: 103 MMOL/L — SIGNIFICANT CHANGE UP (ref 96–108)
CO2 SERPL-SCNC: 25 MMOL/L — SIGNIFICANT CHANGE UP (ref 22–31)
CREAT SERPL-MCNC: 0.83 MG/DL — SIGNIFICANT CHANGE UP (ref 0.5–1.3)
GLUCOSE SERPL-MCNC: 163 MG/DL — HIGH (ref 70–99)
HCT VFR BLD CALC: 35.8 % — SIGNIFICANT CHANGE UP (ref 34.5–45)
HGB BLD-MCNC: 11.8 G/DL — SIGNIFICANT CHANGE UP (ref 11.5–15.5)
MCHC RBC-ENTMCNC: 32.4 PG — SIGNIFICANT CHANGE UP (ref 27–34)
MCHC RBC-ENTMCNC: 33 GM/DL — SIGNIFICANT CHANGE UP (ref 32–36)
MCV RBC AUTO: 98 FL — SIGNIFICANT CHANGE UP (ref 80–100)
PLATELET # BLD AUTO: 209 K/UL — SIGNIFICANT CHANGE UP (ref 150–400)
POTASSIUM SERPL-MCNC: 3.6 MMOL/L — SIGNIFICANT CHANGE UP (ref 3.5–5.3)
POTASSIUM SERPL-SCNC: 3.6 MMOL/L — SIGNIFICANT CHANGE UP (ref 3.5–5.3)
RBC # BLD: 3.66 M/UL — LOW (ref 3.8–5.2)
RBC # FLD: 11.4 % — SIGNIFICANT CHANGE UP (ref 10.3–14.5)
SODIUM SERPL-SCNC: 140 MMOL/L — SIGNIFICANT CHANGE UP (ref 135–145)
T4 FREE SERPL-MCNC: 0.9 NG/DL — SIGNIFICANT CHANGE UP (ref 0.9–1.8)
TSH SERPL-MCNC: 5.69 UIU/ML — HIGH (ref 0.27–4.2)
WBC # BLD: 5.2 K/UL — SIGNIFICANT CHANGE UP (ref 3.8–10.5)
WBC # FLD AUTO: 5.2 K/UL — SIGNIFICANT CHANGE UP (ref 3.8–10.5)

## 2017-06-22 PROCEDURE — 87086 URINE CULTURE/COLONY COUNT: CPT

## 2017-06-22 PROCEDURE — 84439 ASSAY OF FREE THYROXINE: CPT

## 2017-06-22 PROCEDURE — 84295 ASSAY OF SERUM SODIUM: CPT

## 2017-06-22 PROCEDURE — 70450 CT HEAD/BRAIN W/O DYE: CPT

## 2017-06-22 PROCEDURE — 85730 THROMBOPLASTIN TIME PARTIAL: CPT

## 2017-06-22 PROCEDURE — 82947 ASSAY GLUCOSE BLOOD QUANT: CPT

## 2017-06-22 PROCEDURE — 85027 COMPLETE CBC AUTOMATED: CPT

## 2017-06-22 PROCEDURE — 99232 SBSQ HOSP IP/OBS MODERATE 35: CPT

## 2017-06-22 PROCEDURE — 80053 COMPREHEN METABOLIC PANEL: CPT

## 2017-06-22 PROCEDURE — 80048 BASIC METABOLIC PNL TOTAL CA: CPT

## 2017-06-22 PROCEDURE — 71275 CT ANGIOGRAPHY CHEST: CPT

## 2017-06-22 PROCEDURE — 82435 ASSAY OF BLOOD CHLORIDE: CPT

## 2017-06-22 PROCEDURE — 82803 BLOOD GASES ANY COMBINATION: CPT

## 2017-06-22 PROCEDURE — 93005 ELECTROCARDIOGRAM TRACING: CPT | Mod: 76

## 2017-06-22 PROCEDURE — 73502 X-RAY EXAM HIP UNI 2-3 VIEWS: CPT

## 2017-06-22 PROCEDURE — 96375 TX/PRO/DX INJ NEW DRUG ADDON: CPT | Mod: XU

## 2017-06-22 PROCEDURE — 81001 URINALYSIS AUTO W/SCOPE: CPT

## 2017-06-22 PROCEDURE — 99239 HOSP IP/OBS DSCHRG MGMT >30: CPT

## 2017-06-22 PROCEDURE — 82550 ASSAY OF CK (CPK): CPT

## 2017-06-22 PROCEDURE — 85379 FIBRIN DEGRADATION QUANT: CPT

## 2017-06-22 PROCEDURE — A9500: CPT

## 2017-06-22 PROCEDURE — 78452 HT MUSCLE IMAGE SPECT MULT: CPT

## 2017-06-22 PROCEDURE — 80061 LIPID PANEL: CPT

## 2017-06-22 PROCEDURE — 84132 ASSAY OF SERUM POTASSIUM: CPT

## 2017-06-22 PROCEDURE — 84443 ASSAY THYROID STIM HORMONE: CPT

## 2017-06-22 PROCEDURE — 85610 PROTHROMBIN TIME: CPT

## 2017-06-22 PROCEDURE — C1769: CPT

## 2017-06-22 PROCEDURE — 84484 ASSAY OF TROPONIN QUANT: CPT

## 2017-06-22 PROCEDURE — 85014 HEMATOCRIT: CPT

## 2017-06-22 PROCEDURE — C1887: CPT

## 2017-06-22 PROCEDURE — 82330 ASSAY OF CALCIUM: CPT

## 2017-06-22 PROCEDURE — C1894: CPT

## 2017-06-22 PROCEDURE — 83036 HEMOGLOBIN GLYCOSYLATED A1C: CPT

## 2017-06-22 PROCEDURE — 99285 EMERGENCY DEPT VISIT HI MDM: CPT | Mod: 25

## 2017-06-22 PROCEDURE — 80162 ASSAY OF DIGOXIN TOTAL: CPT

## 2017-06-22 PROCEDURE — 83605 ASSAY OF LACTIC ACID: CPT

## 2017-06-22 PROCEDURE — G0378: CPT

## 2017-06-22 PROCEDURE — 96374 THER/PROPH/DIAG INJ IV PUSH: CPT | Mod: XU

## 2017-06-22 PROCEDURE — 93017 CV STRESS TEST TRACING ONLY: CPT

## 2017-06-22 PROCEDURE — 82553 CREATINE MB FRACTION: CPT

## 2017-06-22 PROCEDURE — 83880 ASSAY OF NATRIURETIC PEPTIDE: CPT

## 2017-06-22 PROCEDURE — 93454 CORONARY ARTERY ANGIO S&I: CPT

## 2017-06-22 PROCEDURE — 71045 X-RAY EXAM CHEST 1 VIEW: CPT

## 2017-06-22 RX ORDER — ASPIRIN/CALCIUM CARB/MAGNESIUM 324 MG
1 TABLET ORAL
Qty: 0 | Refills: 0 | COMMUNITY

## 2017-06-22 RX ORDER — ASPIRIN/CALCIUM CARB/MAGNESIUM 324 MG
1 TABLET ORAL
Qty: 0 | Refills: 0 | COMMUNITY
Start: 2017-06-22

## 2017-06-22 RX ORDER — ATORVASTATIN CALCIUM 80 MG/1
1 TABLET, FILM COATED ORAL
Qty: 30 | Refills: 0 | OUTPATIENT
Start: 2017-06-22

## 2017-06-22 RX ADMIN — Medication 5 MILLIGRAM(S): at 05:14

## 2017-06-22 RX ADMIN — Medication 0.25 MILLIGRAM(S): at 05:13

## 2017-06-22 RX ADMIN — Medication 20 MILLIGRAM(S): at 05:14

## 2017-06-22 RX ADMIN — Medication 81 MILLIGRAM(S): at 12:31

## 2017-06-22 NOTE — PROGRESS NOTE ADULT - PROBLEM SELECTOR PLAN 2
- Discussed case with cards consult appreciated,   - s/p cardiac cath - Cath with no obstructive CAD  - c/w ASA, will start Lipitor 20mg PO QHS upon discharge. - Discussed case with cards consult appreciated,   - s/p cardiac cath - Cath with no obstructive CAD  - c/w ASA 81mg, will start Lipitor 20mg PO QHS upon discharge.

## 2017-06-22 NOTE — PROGRESS NOTE ADULT - PROBLEM SELECTOR PLAN 3
- Will c/w enalapril 5mg qd and lasix 20mg qd   - Monitor BP closely
- Will c/w enalapril 5mg qd per home regimen and lasix 20mg qd   - Monitor BP closely
Start Lipitor 20mg daily.
Patient should be on statin therapy. LDL is 183. Start Lipitor 20 mg qd.

## 2017-06-22 NOTE — PROGRESS NOTE ADULT - PROBLEM SELECTOR PLAN 1
p/w palpitations for 2 months; possibly in setting of  paroxysmal afib; although EKG sinus jacobo.    - Cardiology f/u appreciated   - Telemetry - no overnight events, sinus 50-60's, PVCs   - c/w dig 0.25 mg qd  - TSH 5.6, acceptable for age, asymptomatic will monitor, free T4 pending

## 2017-06-22 NOTE — PROGRESS NOTE ADULT - ASSESSMENT
72F w/ unknown PMHx (possibly afib/HTN per meds) here visiting from [place] p/w palpitations, headache and R. hip pain found to have serial trops x2 and nuclear stress positive for reversible defect admitted for further ischemic evaluation s/p cardiac cath yesterday
72F w/unknown PMHx (possibly afib/HTN per meds) here visiting from [place] p/w palpitations, headache and R. hip pain found to have serial trops x2 and nuclear stress positive for reversible defect admitted for further ischemic evaluation.
72 F HTN, HLD presents with chest pain and abnormal stress test.
72 M with HTN, HLD presented with chest pain and abnormal stress test. No significant CAD.

## 2017-06-22 NOTE — PROGRESS NOTE ADULT - SUBJECTIVE AND OBJECTIVE BOX
Patient is a 72y old  Female who presents with a chief complaint of chest pain, palpitations, headaches, symptoms improved, s/p cardiac cath on 6/21/17       SUBJECTIVE / OVERNIGHT EVENTS: Patient seen and examined at bedside, states she is feeling better, no CP, palpitations slept well feeling back to normal.     ROS:  All other review of systems negative    Allergies    No Known Allergies    Intolerances        MEDICATIONS  (STANDING):  aspirin enteric coated 81milliGRAM(s) Oral daily  enalapril 5milliGRAM(s) Oral daily  digoxin     Tablet 0.25milliGRAM(s) Oral daily  furosemide    Tablet 20milliGRAM(s) Oral daily    MEDICATIONS  (PRN):  acetaminophen   Tablet 650milliGRAM(s) Oral every 6 hours PRN pain      Vital Signs Last 24 Hrs  T(C): 36.6, Max: 36.7 (06-22 @ 01:22)  T(F): 97.8, Max: 98.1 (06-22 @ 01:22)  HR: 57 (52 - 87)  BP: 161/76 (101/55 - 165/78)  BP(mean): --  RR: 18 (18 - 19)  SpO2: 97% (96% - 98%)  CAPILLARY BLOOD GLUCOSE    I&O's Summary  I & Os for 24h ending 22 Jun 2017 07:00  =============================================  IN: 720 ml / OUT: 0 ml / NET: 720 ml    I & Os for current day (as of 22 Jun 2017 10:41)  =============================================  IN: 360 ml / OUT: 0 ml / NET: 360 ml    Telemetry: Sinus 50-60's PVCs    PHYSICAL EXAM:  GENERAL: NAD, well-developed  HEAD:  Atraumatic, Normocephalic  EYES: EOMI, PERRLA, conjunctiva and sclera clear  NECK: Supple, No JVD  CHEST/LUNG: Clear to auscultation bilaterally; No wheeze  HEART: Regular rate and rhythm; No murmurs, rubs, or gallops  ABDOMEN: Soft, Nontender, Nondistended; Bowel sounds present  EXTREMITIES:  2+ Peripheral Pulses, No clubbing, cyanosis, or edema  NEUROLOGY: AAOx3, non-focal  PSYCH: calm  SKIN: No rashes or lesions    LABS:                        10.5   4.88  )-----------( 218      ( 21 Jun 2017 07:14 )             33.4     06-21    141  |  109<H>  |  20  ----------------------------<  88  4.2   |  22  |  0.83    Ca    8.8      21 Jun 2017 07:23                RADIOLOGY & ADDITIONAL TESTS:    Imaging Personally Reviewed:    Consultant(s) Notes Reviewed:  Cardiology    Care Discussed with Consultants/Other Providers: Cardiology     Case Discussed with Family:    Goals of Care:

## 2017-06-22 NOTE — PROGRESS NOTE ADULT - SUBJECTIVE AND OBJECTIVE BOX
CC: Chest pain    Interval History: No significant cardiac events overnight. Tolerated cardiac cath.     CORONARY VESSELS: The coronary circulation is left dominant.  LM:   --  LM: Normal.  LAD:   --  LAD: Normal.  CX:   --  Circumflex: Normal.  RCA:   --  RCA: Normal.     MEDICATIONS:  aspirin enteric coated 81milliGRAM(s) Oral daily  acetaminophen   Tablet 650milliGRAM(s) Oral every 6 hours PRN  enalapril 5milliGRAM(s) Oral daily  digoxin     Tablet 0.25milliGRAM(s) Oral daily  furosemide    Tablet 20milliGRAM(s) Oral daily      LABS:  06-21    141  |  109<H>  |  20  ----------------------------<  88  4.2   |  22  |  0.83    Ca    8.8      21 Jun 2017 07:23                            10.5   4.88  )-----------( 218      ( 21 Jun 2017 07:14 )             33.4       VITAL SIGNS:   T(C): 36.6, Max: 36.7 (06-22 @ 01:22)  HR: 57 (52 - 87)  BP: 161/76 (101/55 - 165/78)  RR: 18 (18 - 19)  SpO2: 97% (96% - 98%)  Wt(kg): --  Daily     Daily   I&O's Summary  I & Os for 24h ending 22 Jun 2017 07:00  =============================================  IN: 720 ml / OUT: 0 ml / NET: 720 ml    I & Os for current day (as of 22 Jun 2017 10:34)  =============================================  IN: 360 ml / OUT: 0 ml / NET: 360 ml      TELE: No significant ectopy

## 2020-12-11 NOTE — H&P ADULT - NSHPPOADEEPVENOUSTHROMB_GEN_A_CORE
Pt states at work she began to feel short of breath, tingling in hands, pressure in left side of head, pt states she felt flush. Pt states the head pressure has been going on for two days with the tingling off an on, other symptoms are new today. Denies chest pain. Pt states it felt like she was having palpitations. Denies any pain. Denies n/v/d.  
no

## 2024-06-19 NOTE — ED CDU PROVIDER NOTE - CADM POA CENTRAL LINE
No

## 2024-12-13 NOTE — ED ADULT NURSE NOTE - NS ED NURSE DISCH DISPOSITION
Outreach attempt was made to schedule a Medicare Wellness Visit. This was the first attempt. Contact was made, MWV appointment scheduled.    Patient not called. MWV done 12.12.2024.    
Admitted